# Patient Record
(demographics unavailable — no encounter records)

---

## 2024-10-09 NOTE — REVIEW OF SYSTEMS
[Negative] : Heme/Lymph [Chest Pain] : no chest pain [Shortness Of Breath] : no shortness of breath [Dyspnea on Exertion] : not dyspnea on exertion [Headache] : no headache [Dizziness] : no dizziness

## 2024-10-09 NOTE — END OF VISIT
[FreeTextEntry3] :  Documented by Michelle Hancock acting as a scribe under Dr. Mckeon. 10/09/2024

## 2024-10-09 NOTE — PLAN
[FreeTextEntry1] : see plan.   HTN Needs low fat/ low sodium diet / exercise cont. nifedipine 60 mg QD cont. losartan 100 mg QD cardiology f/u  Pt. instructed to go to the ER immediately if he develops CP, HA, dizziness, SOB/ANDRES.  DM II Need low carb diet/ exercise. cont Jardiance 10 mg QD cont lantus 6 units QHS endocrinology f/u.   Lower ext. edema cont. furosemide 40mg as directed  Elevated PSA urology referral prostate CA screen  HLD cont. rosuvastatin 40mg QD  Low Vit D cont Vit D 50 mcg QD  Bloodwork ordered.  Pt. instructed to follow up in 1-2 days to monitor HTN.

## 2024-10-09 NOTE — DISCUSSION/SUMMARY
[FreeTextEntry1] : The patient is a 62-year-old gentleman Ukrainian speaking. DM, HTN, HLD, CKD V who is hypertensive. #1 CV- echo LVH #2 HTN- c/w nifedipine 60mg, losartan 50mg, add nifedipine 30mg and continue logging. #3 HLD- was not taking atorvastatin 80 daily secondary to myalgias, c/w rosuvastatin 40mg  #4 DM- improving on insulin, Jardiance, lantus and semglee. #5 CKD- stage V, K=5.0, cr 3.52 #6 - hold on cialis until above available. [EKG obtained to assist in diagnosis and management of assessed problem(s)] : EKG obtained to assist in diagnosis and management of assessed problem(s)

## 2024-10-09 NOTE — PLAN
[FreeTextEntry1] : See plan.  HTN low sodium/low caffeine diet & exercise discussed w/ pt. cont. nifedipine 60 mg Qam, 30 mg QHS as directed cont. losartan 100 mg QD following w/ cardiology (Dr. Ignacio) Pt. instructed to go to the ER immediately if he develops CP, HA, dizziness, SOB/ANDRES.  CKD V cont. lokelma 10 GM oral packet as directed follow up w/ nephrology.  DM II low carb diet & exercise discussed w/ pt. cont. jardiance 10 mg QD cont. lantus 6 units QHS follow up w/ endocrinology  Lower ext. edema cont. furosemide 40 mg PRN  HLD low cholesterol/low fat diet & exercise discussed w/ pt. cont. rosuvastatin 40mg QD  Low Vit D cont Vit D 50 mcg QD  Pt. instructed to follow up in 2 weeks to monitor HTN.

## 2024-10-09 NOTE — REVIEW OF SYSTEMS
[Chest Pain] : no chest pain [Shortness Of Breath] : no shortness of breath [Dyspnea on Exertion] : not dyspnea on exertion [Abdominal Pain] : no abdominal pain [Nausea] : no nausea [Constipation] : no constipation [Diarrhea] : no diarrhea [Vomiting] : no vomiting [Headache] : no headache [Dizziness] : no dizziness [Negative] : Heme/Lymph

## 2024-10-09 NOTE — HISTORY OF PRESENT ILLNESS
[de-identified] : Mr. JIGNESH TAPIA is a 62 year old male w/ Hx. HTN, DM II, nephrolithiasis, and HLD presenting for follow up on chronic problems. Pt is accompanied by his daughter on the phone.  Pt. is very hypertensive at present (approx. 200/100). He forgot to take his HTN medication this morning. He has not been compliant w/ his HTN medications. Denies HA, dizziness, and CP. Pt admits that he drinks coffee, denies smoking/alcohol use.  Pt. is currently on humalog, lantus, and jardiance for DM II. He is not currently following w/ endocrinology. He follows up with [facility] nephrology, does not remember the name of his physician. Pt has not followed up w/urologist for elevated PSA. Denies abdominal pain, N/V, C/D. Denies CP, SOB, ANDRES, dizziness. He is otherwise well and offers no additional complaints.

## 2024-10-09 NOTE — END OF VISIT
[FreeTextEntry3] :  Documented by Michelle Hancock acting as a scribe under Dr. Mckeon. 10/08/2024

## 2024-10-09 NOTE — HISTORY OF PRESENT ILLNESS
[de-identified] : Mr. JIGNESH TAPIA is a 62 year old male w/ Hx. HTN, DM II, CKD V, and HLD presenting for follow up on HTN. Pt. was seen yesterday (10/8) for follow up and was very hypertensive, systolic BP approx. 200. He followed w/ Dr. Ignacio today, who added nifedipine 30 mg QHS in addition to the nifedipine 60 mg Qam. Cardiology workup revealed sinus rhythm w/ no signs of ischemia. BP is 169/67 at present. He was started on lokelma 10 gm for CKD. He reports that he is compliant w/ his medications and says that he will try following a healthy diet. Denies HA, CP, dizziness. He offers no additional complaints.

## 2024-10-09 NOTE — HISTORY OF PRESENT ILLNESS
[FreeTextEntry1] : Tarik thought the prescription for his nifedipine was wrong. BP has been very high. Dtr on phone and checks his BP always over 140-160.

## 2024-10-09 NOTE — HISTORY OF PRESENT ILLNESS
[de-identified] : Mr. JIGNESH TAPIA is a 62 year old male w/ Hx. HTN, DM II, nephrolithiasis, and HLD presenting for follow up on chronic problems. Pt is accompanied by his daughter on the phone.  Pt. is very hypertensive at present (approx. 200/100). He forgot to take his HTN medication this morning. He has not been compliant w/ his HTN medications. Denies HA, dizziness, and CP. Pt admits that he drinks coffee, denies smoking/alcohol use.  Pt. is currently on humalog, lantus, and jardiance for DM II. He is not currently following w/ endocrinology. He follows up with [facility] nephrology, does not remember the name of his physician. Pt has not followed up w/urologist for elevated PSA. Denies abdominal pain, N/V, C/D. Denies CP, SOB, ANDRES, dizziness. He is otherwise well and offers no additional complaints.

## 2024-10-28 NOTE — HISTORY OF PRESENT ILLNESS
[de-identified] : Mr. JIGNESH TAPIA is a 62 year old male w/ Hx. HTN, DM II, CKD V, and HLD presenting for follow up on HTN. Patient is doing well. He was hypertensive during the last visit. He reports being compliant with all his medications and tries to follow a low sodium diet. BP is normal at present.  Previous lab results reveal anemia, high creatinine levels and controlled HGB A1C. Otherwise labs show unremarkable. He will follow up with urologist for past elevated PSA levels on 11/04. He states following up with nephrologist regularly. Denies HA's, CP, SOB, dizziness, palpitations. He is otherwise well and offers no complaints.

## 2024-10-28 NOTE — HISTORY OF PRESENT ILLNESS
[de-identified] : Mr. JIGNESH TAPIA is a 62 year old male w/ Hx. HTN, DM II, CKD V, and HLD presenting for follow up on HTN. Patient is doing well. He was hypertensive during the last visit. He reports being compliant with all his medications and tries to follow a low sodium diet. BP is normal at present.  Previous lab results reveal anemia, high creatinine levels and controlled HGB A1C. Otherwise labs show unremarkable. He will follow up with urologist for past elevated PSA levels on 11/04. He states following up with nephrologist regularly. Denies HA's, CP, SOB, dizziness, palpitations. He is otherwise well and offers no complaints.

## 2024-10-28 NOTE — PLAN
[FreeTextEntry1] : See plan.  HTN low sodium/low caffeine diet & exercise discussed w/ pt. cont. nifedipine 60 mg Qam, 30 mg QHS as directed cont. losartan 100 mg QD following w/ cardiology (Dr. Ignacio)  HLD low cholesterol/low fat diet & exercise discussed w/ pt. cont. rosuvastatin 40mg QD  CKD cont. lokelma 10 GM oral packet as directed follow up w/ nephrology.  DM II low carb diet & exercise discussed w/ pt. cont. jardiance 10 mg QD cont. lantus 6 units QHS follow up w/ endocrinology  Lower ext. edema cont. furosemide 40 mg PRN  Low Vit D cont Vit D 50 mcg QD  Pt. instructed to follow up in 3 months.

## 2024-11-04 NOTE — ASSESSMENT
[FreeTextEntry1] : Very pleasant 62-year-old gentleman who presents for evaluation of elevated PSA, erectile dysfunction -We had an extensive discussion regarding possible management options for erectile dysfunction, including PDE 5 inhibitors, NATHALIE, ICI, intraurethral alprostadil, penile prosthesis -After a thorough discussion of the risks and benefits of the aforementioned options he would like to continue Cialis 20 mg -Prescription for Cialis 20 mg sent to the pharmacy -I discussed the risks, benefits, alternatives, and possible side effects of Cialis (tadalafil) therapy with the patient, including but not limited to headache, flushing, upset stomach, blurry vision, change in color vision, vision loss, and priapism with the patient. -PSA 5.67 from May 2024; rechecked and was found to be 3.6 in October 2024 -We discussed normal age-adjusted PSA ranges -We discussed the potential etiologies of an elevated PSA, including but not limited to prostate cancer, urinary tract infections, prostatitis, BPH, and recent ejaculation. -We discussed the differences between prostate MRI and a prostate biopsy for evaluation for prostate cancer.  We discussed the risks and benefits of both a prostate biopsy versus a prostate MRI, including the limitations of both.  We discussed the benefit of obtaining an MRI prior to a prostate biopsy with the advantage of being able to do a transperineal fusion biopsy after MRI.  After thorough discussion of the risks and benefits of each he would like to proceed with a prostate MRI in anticipation of a fusion biopsy -Follow-up in 1 month, after MRI  Patient is being seen today for evaluation and management of a chronic and longitudinal ongoing condition and I am of the primary treating physician

## 2025-04-24 NOTE — PHYSICAL EXAM
[No Acute Distress] : no acute distress [Well Nourished] : well nourished [Well Developed] : well developed [Well-Appearing] : well-appearing [Normal Sclera/Conjunctiva] : normal sclera/conjunctiva [PERRL] : pupils equal round and reactive to light [EOMI] : extraocular movements intact [Normal Outer Ear/Nose] : the outer ears and nose were normal in appearance [Normal Oropharynx] : the oropharynx was normal [No JVD] : no jugular venous distention [No Lymphadenopathy] : no lymphadenopathy [Supple] : supple [Thyroid Normal, No Nodules] : the thyroid was normal and there were no nodules present [No Respiratory Distress] : no respiratory distress  [No Accessory Muscle Use] : no accessory muscle use [Clear to Auscultation] : lungs were clear to auscultation bilaterally [Normal Rate] : normal rate  [Regular Rhythm] : with a regular rhythm [Normal S1, S2] : normal S1 and S2 [No Murmur] : no murmur heard [No Carotid Bruits] : no carotid bruits [No Abdominal Bruit] : a ~M bruit was not heard ~T in the abdomen [No Varicosities] : no varicosities [Pedal Pulses Present] : the pedal pulses are present [No Edema] : there was no peripheral edema [No Palpable Aorta] : no palpable aorta [No Extremity Clubbing/Cyanosis] : no extremity clubbing/cyanosis [Soft] : abdomen soft [Non Tender] : non-tender [Non-distended] : non-distended [No Masses] : no abdominal mass palpated [No HSM] : no HSM [Normal Bowel Sounds] : normal bowel sounds [Sprain/Strain] : sprain/strain [Normal Posterior Cervical Nodes] : no posterior cervical lymphadenopathy [Torn Ligament/Tendon/Muscle] : torn ligament, tendon or muscle [Normal Anterior Cervical Nodes] : no anterior cervical lymphadenopathy [Was the competent medical cause of the injury] : was the competent medical cause of the injury [Are consistent with the injury] : are consistent with the injury [No CVA Tenderness] : no CVA  tenderness [Consistent with my objective findings] : consistent with my objective findings [No Spinal Tenderness] : no spinal tenderness [No Joint Swelling] : no joint swelling [Total (100%)] : total (100%) [Does not reveal pre-existing condition(s) that may affect treatment/prognosis] : does not reveal pre-existing condition(s) that may affect treatment/prognosis [Grossly Normal Strength/Tone] : grossly normal strength/tone [No Rash] : no rash [Coordination Grossly Intact] : coordination grossly intact [No Focal Deficits] : no focal deficits [Normal Gait] : normal gait [Deep Tendon Reflexes (DTR)] : deep tendon reflexes were 2+ and symmetric [Normal Affect] : the affect was normal [Normal Insight/Judgement] : insight and judgment were intact